# Patient Record
Sex: MALE
[De-identification: names, ages, dates, MRNs, and addresses within clinical notes are randomized per-mention and may not be internally consistent; named-entity substitution may affect disease eponyms.]

---

## 2022-11-10 ENCOUNTER — NON-APPOINTMENT (OUTPATIENT)
Age: 62
End: 2022-11-10

## 2022-11-10 PROBLEM — Z00.00 ENCOUNTER FOR PREVENTIVE HEALTH EXAMINATION: Status: ACTIVE | Noted: 2022-11-10

## 2022-11-11 ENCOUNTER — APPOINTMENT (OUTPATIENT)
Dept: UROLOGY | Facility: CLINIC | Age: 62
End: 2022-11-11
Payer: MEDICARE

## 2022-11-11 VITALS
SYSTOLIC BLOOD PRESSURE: 146 MMHG | BODY MASS INDEX: 27.9 KG/M2 | HEART RATE: 96 BPM | DIASTOLIC BLOOD PRESSURE: 85 MMHG | WEIGHT: 206 LBS | RESPIRATION RATE: 16 BRPM | OXYGEN SATURATION: 95 % | HEIGHT: 72 IN

## 2022-11-11 DIAGNOSIS — Z95.5 PRESENCE OF CORONARY ANGIOPLASTY IMPLANT AND GRAFT: ICD-10-CM

## 2022-11-11 DIAGNOSIS — Z82.3 FAMILY HISTORY OF STROKE: ICD-10-CM

## 2022-11-11 DIAGNOSIS — Z86.39 PERSONAL HISTORY OF OTHER ENDOCRINE, NUTRITIONAL AND METABOLIC DISEASE: ICD-10-CM

## 2022-11-11 DIAGNOSIS — Z78.9 OTHER SPECIFIED HEALTH STATUS: ICD-10-CM

## 2022-11-11 DIAGNOSIS — Z86.79 PERSONAL HISTORY OF OTHER DISEASES OF THE CIRCULATORY SYSTEM: ICD-10-CM

## 2022-11-11 PROCEDURE — 76872 US TRANSRECTAL: CPT

## 2022-11-11 PROCEDURE — 99204 OFFICE O/P NEW MOD 45 MIN: CPT

## 2022-11-11 RX ORDER — FENOFIBRATE 160 MG/1
160 TABLET ORAL
Qty: 90 | Refills: 0 | Status: ACTIVE | COMMUNITY
Start: 2022-11-07

## 2022-11-11 RX ORDER — METOPROLOL TARTRATE 25 MG/1
25 TABLET, FILM COATED ORAL
Qty: 180 | Refills: 0 | Status: ACTIVE | COMMUNITY
Start: 2022-11-07

## 2022-11-11 RX ORDER — CLOPIDOGREL BISULFATE 75 MG/1
75 TABLET, FILM COATED ORAL
Qty: 90 | Refills: 0 | Status: ACTIVE | COMMUNITY
Start: 2022-11-07

## 2022-11-11 RX ORDER — FLUTICASONE PROPIONATE 50 UG/1
50 SPRAY, METERED NASAL
Qty: 16 | Refills: 0 | Status: ACTIVE | COMMUNITY
Start: 2022-05-19

## 2022-11-11 RX ORDER — MUPIROCIN 20 MG/G
2 OINTMENT TOPICAL
Qty: 22 | Refills: 0 | Status: ACTIVE | COMMUNITY
Start: 2022-11-07

## 2022-11-11 RX ORDER — TAMSULOSIN HYDROCHLORIDE 0.4 MG/1
0.4 CAPSULE ORAL
Qty: 180 | Refills: 0 | Status: ACTIVE | COMMUNITY
Start: 2022-10-24

## 2022-11-11 RX ORDER — CIPROFLOXACIN HYDROCHLORIDE 500 MG/1
500 TABLET, FILM COATED ORAL
Qty: 20 | Refills: 0 | Status: ACTIVE | COMMUNITY
Start: 2022-10-24

## 2022-11-11 RX ORDER — FINASTERIDE 5 MG/1
5 TABLET, FILM COATED ORAL
Qty: 90 | Refills: 0 | Status: ACTIVE | COMMUNITY
Start: 2022-10-24

## 2022-11-11 RX ORDER — ROSUVASTATIN CALCIUM 5 MG/1
5 TABLET, FILM COATED ORAL
Qty: 90 | Refills: 0 | Status: ACTIVE | COMMUNITY
Start: 2022-11-07

## 2022-11-11 RX ORDER — AMOXICILLIN AND CLAVULANATE POTASSIUM 875; 125 MG/1; MG/1
875-125 TABLET, COATED ORAL
Qty: 20 | Refills: 0 | Status: ACTIVE | COMMUNITY
Start: 2022-10-25

## 2022-11-11 RX ORDER — TAMSULOSIN HYDROCHLORIDE 0.4 MG/1
0.4 CAPSULE ORAL
Refills: 0 | Status: ACTIVE | COMMUNITY

## 2022-11-11 NOTE — ASSESSMENT
[FreeTextEntry1] : The patient has recurrent urinary retention despite combination pharmacotherapy, secondary to very large volume prostate. Transrectal ultrasound today showed a prostate with a 200 cc volume. I discussed the options for his condition including  open or robotic simple prostatectomy to remove the prostate, aquablation, or llaser enucleation.  Patient expressed interest in an enucleation and he will be referred to Dr. Knox for further evaluation and information on the enucleation as to make a more informed decision. He was instructed to continue taking his finasteride and tamsulosin for now.

## 2022-11-11 NOTE — PHYSICAL EXAM
[General Appearance - Well Developed] : well developed [General Appearance - Well Nourished] : well nourished [Normal Appearance] : normal appearance [Well Groomed] : well groomed [General Appearance - In No Acute Distress] : no acute distress [Edema] : no peripheral edema [Respiration, Rhythm And Depth] : normal respiratory rhythm and effort [Exaggerated Use Of Accessory Muscles For Inspiration] : no accessory muscle use [Abdomen Soft] : soft [Abdomen Tenderness] : non-tender [Costovertebral Angle Tenderness] : no ~M costovertebral angle tenderness [Urethral Meatus] : meatus normal [Urinary Bladder Findings] : the bladder was normal on palpation [Scrotum] : the scrotum was normal [Testes Mass (___cm)] : there were no testicular masses [No Prostate Nodules] : no prostate nodules [Prostate Size ___ (0-4)] : prostate size [unfilled] (scale: 0-4) [Normal Station and Gait] : the gait and station were normal for the patient's age [] : no rash [No Focal Deficits] : no focal deficits [Oriented To Time, Place, And Person] : oriented to person, place, and time [Mood] : the mood was normal [Affect] : the affect was normal [Not Anxious] : not anxious [No Palpable Adenopathy] : no palpable adenopathy [Prostate Tenderness] : the prostate was not tender [FreeTextEntry1] : catheter in place, draining clear urine

## 2022-11-11 NOTE — HISTORY OF PRESENT ILLNESS
[FreeTextEntry1] : Patient is a 61 y/o male that presents with history of enlarged prostate. He has seen a urologist for the past 4 years, and is currently on finasteride and tamsulosin. His urologist recommended he have a surgery to remove the prostate due to the size. Patient was referred by Dr. Harris. He reports visiting the ED 3 times in the past month due to urinary retention. Patient's latest catheter was placed 2 days ago. \par \par Patient is typically voiding with a slow stream, frequency, hesitancy, urgency, and nocturia every 2 hours. He has post-void dribbling, stress and urge incontinence, as well suprapubic pain that he describes a a pressure and burning sensation.  Patient wears diapers due to his continence. \par \par Patient did have a prostate biopsy 2 years ago done by  Dr. Alvarez that reportefly showed no signs of prostate cancer. Patient denies family history of prostate cancer.

## 2022-11-11 NOTE — REVIEW OF SYSTEMS
[Pain during urination] : pain during urination [Wake up at night to urinate  How many times?  ___] : wakes up to urinate [unfilled] times during the night [Strain or push to urinate] : strain or push to urinate [Slow urine stream] : slow urine stream [Leakage of urine with urgency] : leakage of urine with urgency [Leakage of urine with straining, coughing, laughing] : leakage of urine with straining, coughing, laughing [Negative] : Heme/Lymph [Incontinence] : incontinence [Hesitancy] : urinary hesitancy [Nocturia] : nocturia [Urine Infection (bladder/kidney)] : denies bladder/kidney infections [Pain at onset of urination] : denies pain during onset of urination [Pain after urination] : denies pain after urination [Blood in urine that you can see] : denies seeing blood in urine [Told you have blood in urine on a urine test] : denies being told that blood was present in a urine test [Discharge from urine canal] : denies discharge from urine canal [History of kidney stones] : denies history of kidney stones [Urine retention] : denies urine retention [Strong urge to urinate] : denies strong urge to urinate [Bladder pressure] : denies bladder pressure [Wait a long time to urinate] : denies waiting a long time to urinate [Interrupted urine stream] : denies interrupted urine stream [Bladder fullness after urinating] : denies bladder fullness after urinating [Increased pain/discomfort with bladder filling] : denies increased pain/discomfort with bladder filling [Bladder problems as child. If yes, describe..] : denies bladder problems as child [Unaware of when urine is leaking] : aware of when urine is leaking

## 2022-11-11 NOTE — LETTER BODY
[Dear  ___] : Dear  [unfilled], [Please see my note below.] : Please see my note below. [Consult Closing:] : Thank you very much for allowing me to participate in the care of this patient.  If you have any questions, please do not hesitate to contact me. [Sincerely,] : Sincerely, [Consult Letter:] : I had the pleasure of evaluating your patient, [unfilled]. [FreeTextEntry3] : Marty

## 2022-11-11 NOTE — ADDENDUM
[FreeTextEntry1] : \par April MUNOZ documented this note as a scribe on behalf of Dr. Marty Mckeon MD.\par

## 2022-11-16 ENCOUNTER — APPOINTMENT (OUTPATIENT)
Dept: UROLOGY | Facility: CLINIC | Age: 62
End: 2022-11-16

## 2022-11-16 VITALS
BODY MASS INDEX: 27.9 KG/M2 | DIASTOLIC BLOOD PRESSURE: 84 MMHG | SYSTOLIC BLOOD PRESSURE: 135 MMHG | HEIGHT: 72 IN | HEART RATE: 67 BPM | TEMPERATURE: 98.3 F | WEIGHT: 206 LBS

## 2022-11-16 PROCEDURE — 99214 OFFICE O/P EST MOD 30 MIN: CPT

## 2022-11-16 NOTE — HISTORY OF PRESENT ILLNESS
[FreeTextEntry1] : 62 yr old male, referred by Dr. Mckeon, to discuss prostate reductive surgery with HoLEP. Patient has history of enlarged prostate that he has been seeing a urologist, Dr. Harris,  for 4 years and has recommended he have a procedure due to size. He has been to ED 3x in the last month for urinary retention He currently has a catheter that was placed on 11/9/22. His prostate measured 200 cc on ultrasound on 11/10/22 with Dr. Mckeon.\par \par His baseline urination is slow stream, frequency, urgency, hesitancy, nocturia, post-void dribbling. He has stress and urge incontinence which requires him to wear depends. \par \par He underwent prostate biopsy in 2020 which was reportedly benign. \par \par He is taking aspirin and plavix for CAD. He had cardaic stents placed ten years ago. He was told that he cannot stop plavix for more than a few days. 12-Apr-2021 09:43

## 2022-11-16 NOTE — PHYSICAL EXAM
[General Appearance - Well Developed] : well developed [General Appearance - Well Nourished] : well nourished [Normal Appearance] : normal appearance [Well Groomed] : well groomed [General Appearance - In No Acute Distress] : no acute distress [Edema] : no peripheral edema [Respiration, Rhythm And Depth] : normal respiratory rhythm and effort [Exaggerated Use Of Accessory Muscles For Inspiration] : no accessory muscle use [Abdomen Soft] : soft [Abdomen Tenderness] : non-tender [Costovertebral Angle Tenderness] : no ~M costovertebral angle tenderness [Urethral Meatus] : meatus normal [Urinary Bladder Findings] : the bladder was normal on palpation [Scrotum] : the scrotum was normal [Testes Mass (___cm)] : there were no testicular masses [Normal Station and Gait] : the gait and station were normal for the patient's age [] : no rash [No Focal Deficits] : no focal deficits [Oriented To Time, Place, And Person] : oriented to person, place, and time [Affect] : the affect was normal [Mood] : the mood was normal [Not Anxious] : not anxious [No Palpable Adenopathy] : no palpable adenopathy [FreeTextEntry1] : Catheter in place

## 2022-11-16 NOTE — ASSESSMENT
[FreeTextEntry1] : Assessment: Mr. NEETU BERUMEN  is a 62 year year old man with urinary retention, prostate volume of 200 cc. This is most likely due to bladder outlet obstruction secondary to his enlarged prostate, although it is difficult to assess the role of bladder dysfunction, contributing to his symptoms.\par \par We discussed different therapeutic options including TURP, PVP, simple prostatectomy (open, laparoscopic or transurethral laser enucleation), Aquablation, Urolift therapy, and Rezum in addition to full continuation of medical therapy. Discussed the issues of incontinence, retrograde ejaculation, erectile dysfunction, irritative voiding symptoms, injury to urethra and bladder, persistence of irritative voiding symptoms, urethral stricture or bladder neck contracture, need for open surgery to repair the injury, erectile dysfunction and infertility.\par \par I made it clear that because of his large prostate size > 80 grams, AUA recommendation is to perform a simple prostatectomy due to the ability to obtain effective and durable improvement in voiding symptoms. A simple prostatectomy can be performed via an open approach, laparoscopic approach or transurethral laser enucleation approach. We discussed the risks and benefits of each approach. Overall, the long term outcomes are similar. However, the laser enucleation procedure has the least morbidity with quickest recovery of the three options. Therefore, I have recommended laser enucleation of the prostate performed via a transurethral approach.\par \par Mr. BERUMEN decided to proceed with [employer] laser enucleation of prostate followed by prostate morcellation. Therefore, I spent a good amount of time discussing the risks and benefits of this procedure. We discussed potential risks of incontinence, retrograde ejaculation and infertility, erectile dysfunction, irritative voiding symptoms, injury to the urethra and bladder, rare need to convert to an open surgery.\par \par  - OR for ThuLEP/HoLEP\par  - Pre-op cardiac clearance. Ideally, he can hold his plavix joel-op.\par  - He is currently taking doxycycline for recent tick bite\par  - F/U urine culture

## 2022-11-16 NOTE — LETTER BODY
[Dear  ___] : Dear  [unfilled], [Courtesy Letter:] : I had the pleasure of seeing your patient, [unfilled], in my office today. [Please see my note below.] : Please see my note below. [Consult Closing:] : Thank you very much for allowing me to participate in the care of this patient.  If you have any questions, please do not hesitate to contact me. [Sincerely,] : Sincerely, [FreeTextEntry3] : Zachery Knox MD

## 2022-11-17 LAB
ALBUMIN SERPL ELPH-MCNC: 4.8 G/DL
ALP BLD-CCNC: 96 U/L
ALT SERPL-CCNC: 13 U/L
ANION GAP SERPL CALC-SCNC: 14 MMOL/L
APPEARANCE: ABNORMAL
APTT BLD: 32.5 SEC
AST SERPL-CCNC: 16 U/L
BACTERIA: NEGATIVE
BASOPHILS # BLD AUTO: 0.02 K/UL
BASOPHILS NFR BLD AUTO: 0.3 %
BILIRUB SERPL-MCNC: 0.6 MG/DL
BILIRUBIN URINE: NEGATIVE
BLOOD URINE: ABNORMAL
BUN SERPL-MCNC: 13 MG/DL
CALCIUM SERPL-MCNC: 10.6 MG/DL
CHLORIDE SERPL-SCNC: 102 MMOL/L
CO2 SERPL-SCNC: 23 MMOL/L
COLOR: ABNORMAL
CREAT SERPL-MCNC: 0.87 MG/DL
EGFR: 96 ML/MIN/1.73M2
EOSINOPHIL # BLD AUTO: 0.08 K/UL
EOSINOPHIL NFR BLD AUTO: 1.1 %
GLUCOSE QUALITATIVE U: NEGATIVE
GLUCOSE SERPL-MCNC: 108 MG/DL
HCT VFR BLD CALC: 47.9 %
HGB BLD-MCNC: 15.8 G/DL
HYALINE CASTS: 0 /LPF
IMM GRANULOCYTES NFR BLD AUTO: 0.3 %
INR PPP: 1.1 RATIO
KETONES URINE: NEGATIVE
LEUKOCYTE ESTERASE URINE: NEGATIVE
LYMPHOCYTES # BLD AUTO: 2.14 K/UL
LYMPHOCYTES NFR BLD AUTO: 30.1 %
MAN DIFF?: NORMAL
MCHC RBC-ENTMCNC: 29.5 PG
MCHC RBC-ENTMCNC: 33 GM/DL
MCV RBC AUTO: 89.4 FL
MICROSCOPIC-UA: NORMAL
MONOCYTES # BLD AUTO: 0.65 K/UL
MONOCYTES NFR BLD AUTO: 9.1 %
NEUTROPHILS # BLD AUTO: 4.2 K/UL
NEUTROPHILS NFR BLD AUTO: 59.1 %
NITRITE URINE: NEGATIVE
PH URINE: 5.5
PLATELET # BLD AUTO: 253 K/UL
POTASSIUM SERPL-SCNC: 4.5 MMOL/L
PROT SERPL-MCNC: 7.3 G/DL
PROTEIN URINE: NEGATIVE
PT BLD: 13 SEC
RBC # BLD: 5.36 M/UL
RBC # FLD: 13.1 %
RED BLOOD CELLS URINE: 13 /HPF
SODIUM SERPL-SCNC: 139 MMOL/L
SPECIFIC GRAVITY URINE: 1.01
SQUAMOUS EPITHELIAL CELLS: 0 /HPF
UROBILINOGEN URINE: NORMAL
WBC # FLD AUTO: 7.11 K/UL
WHITE BLOOD CELLS URINE: 1 /HPF

## 2022-11-18 LAB — BACTERIA UR CULT: NORMAL

## 2022-11-18 RX ORDER — METOPROLOL TARTRATE 50 MG
1 TABLET ORAL
Qty: 0 | Refills: 0 | DISCHARGE

## 2022-11-18 RX ORDER — ROSUVASTATIN CALCIUM 5 MG/1
1 TABLET ORAL
Qty: 0 | Refills: 0 | DISCHARGE

## 2022-11-18 RX ORDER — FENOFIBRATE,MICRONIZED 130 MG
1 CAPSULE ORAL
Qty: 0 | Refills: 0 | DISCHARGE

## 2022-11-18 NOTE — ASU PATIENT PROFILE, ADULT - NSICDXPASTMEDICALHX_GEN_ALL_CORE_FT
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

## 2022-11-18 NOTE — ASU PATIENT PROFILE, ADULT - FALL HARM RISK - UNIVERSAL INTERVENTIONS
Bed in lowest position, wheels locked, appropriate side rails in place/Call bell, personal items and telephone in reach/Instruct patient to call for assistance before getting out of bed or chair/Non-slip footwear when patient is out of bed/[place] to call system/Physically safe environment - no spills, clutter or unnecessary equipment/Purposeful Proactive Rounding/Room/bathroom lighting operational, light cord in reach

## 2022-11-20 ENCOUNTER — TRANSCRIPTION ENCOUNTER (OUTPATIENT)
Age: 62
End: 2022-11-20

## 2022-11-21 ENCOUNTER — APPOINTMENT (OUTPATIENT)
Dept: UROLOGY | Facility: AMBULATORY SURGERY CENTER | Age: 62
End: 2022-11-21

## 2022-11-21 ENCOUNTER — INPATIENT (INPATIENT)
Facility: HOSPITAL | Age: 62
LOS: 0 days | Discharge: HOME | DRG: 713 | End: 2022-11-22
Attending: SURGERY | Admitting: SURGERY
Payer: MEDICARE

## 2022-11-21 ENCOUNTER — RESULT REVIEW (OUTPATIENT)
Age: 62
End: 2022-11-21

## 2022-11-21 ENCOUNTER — TRANSCRIPTION ENCOUNTER (OUTPATIENT)
Age: 62
End: 2022-11-21

## 2022-11-21 VITALS
HEIGHT: 72 IN | TEMPERATURE: 98 F | DIASTOLIC BLOOD PRESSURE: 78 MMHG | OXYGEN SATURATION: 98 % | HEART RATE: 58 BPM | WEIGHT: 205.91 LBS | SYSTOLIC BLOOD PRESSURE: 124 MMHG | RESPIRATION RATE: 16 BRPM

## 2022-11-21 DIAGNOSIS — Z98.890 OTHER SPECIFIED POSTPROCEDURAL STATES: Chronic | ICD-10-CM

## 2022-11-21 PROCEDURE — 52649 PROSTATE LASER ENUCLEATION: CPT | Mod: 22

## 2022-11-21 PROCEDURE — 88305 TISSUE EXAM BY PATHOLOGIST: CPT | Mod: 26

## 2022-11-21 DEVICE — URETERAL CATH OPEN END 5FR 70CM: Type: IMPLANTABLE DEVICE | Status: FUNCTIONAL

## 2022-11-21 DEVICE — GUIDEWIRE SENSOR DUAL-FLEX NITINOL ANGLED .035" X 150CM: Type: IMPLANTABLE DEVICE | Status: FUNCTIONAL

## 2022-11-21 RX ORDER — ASPIRIN/CALCIUM CARB/MAGNESIUM 324 MG
325 TABLET ORAL ONCE
Refills: 0 | Status: COMPLETED | OUTPATIENT
Start: 2022-11-21 | End: 2022-11-21

## 2022-11-21 RX ORDER — TAMSULOSIN HYDROCHLORIDE 0.4 MG/1
2 CAPSULE ORAL
Qty: 0 | Refills: 0 | DISCHARGE

## 2022-11-21 RX ORDER — ACETAMINOPHEN 500 MG
650 TABLET ORAL EVERY 6 HOURS
Refills: 0 | Status: DISCONTINUED | OUTPATIENT
Start: 2022-11-21 | End: 2022-11-22

## 2022-11-21 RX ORDER — ONDANSETRON 8 MG/1
4 TABLET, FILM COATED ORAL EVERY 6 HOURS
Refills: 0 | Status: DISCONTINUED | OUTPATIENT
Start: 2022-11-21 | End: 2022-11-22

## 2022-11-21 RX ORDER — SODIUM CHLORIDE 9 MG/ML
1000 INJECTION, SOLUTION INTRAVENOUS
Refills: 0 | Status: DISCONTINUED | OUTPATIENT
Start: 2022-11-21 | End: 2022-11-22

## 2022-11-21 RX ORDER — FINASTERIDE 5 MG/1
1 TABLET, FILM COATED ORAL
Qty: 0 | Refills: 0 | DISCHARGE

## 2022-11-21 RX ORDER — AMPICILLIN TRIHYDRATE 250 MG
1000 CAPSULE ORAL EVERY 6 HOURS
Refills: 0 | Status: DISCONTINUED | OUTPATIENT
Start: 2022-11-21 | End: 2022-11-22

## 2022-11-21 RX ORDER — METHENAMINE, SODIUM PHOSPHATE, MONOBASIC, MONOHYDRATE, PHENYL SALICYLATE, METHYLENE BLUE, AND HYOSCYAMINE SULFATE 120; 40.8; 36; 10; .12 MG/1; MG/1; MG/1; MG/1; MG/1
1 CAPSULE ORAL
Qty: 0 | Refills: 0 | DISCHARGE

## 2022-11-21 RX ORDER — METOPROLOL TARTRATE 50 MG
25 TABLET ORAL DAILY
Refills: 0 | Status: DISCONTINUED | OUTPATIENT
Start: 2022-11-21 | End: 2022-11-22

## 2022-11-21 RX ORDER — GENTAMICIN SULFATE 40 MG/ML
80 VIAL (ML) INJECTION EVERY 8 HOURS
Refills: 0 | Status: DISCONTINUED | OUTPATIENT
Start: 2022-11-21 | End: 2022-11-22

## 2022-11-21 RX ADMIN — Medication 108 MILLIGRAM(S): at 22:26

## 2022-11-21 RX ADMIN — Medication 325 MILLIGRAM(S): at 14:51

## 2022-11-21 NOTE — ASU DISCHARGE PLAN (ADULT/PEDIATRIC) - CARE PROVIDER_API CALL
Zachery Knox)  Urology  [address] [place] [address]  Phone: [phone]  Fax: [phone]  Follow Up Time:

## 2022-11-21 NOTE — BRIEF OPERATIVE NOTE - OPERATION/FINDINGS
Laser enucleation of prostate with morcellation. 22F 3-way coude placed at end of case and CBI started. Patient tolerated the procedure well and was transferred to PACU in stable condition. Please see operative note for full details of the case.

## 2022-11-21 NOTE — H&P ADULT - NSICDXPASTMEDICALHX_GEN_ALL_CORE_FT
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

## 2022-11-21 NOTE — H&P ADULT - ASSESSMENT
62 yr old male with history of HTN, HLD, CAD with stents, DM, CVA, BPH s/p laser enucleation of prostate 11/21/22.    Plan:  -Admit to regional  -Continue 22F 3-way coude on CBI  -Trial of void x3 tomorrow  -Regular diet  -IVF  -Pain/nausea control  -Continue amp/gent for 24 hours

## 2022-11-21 NOTE — ASU DISCHARGE PLAN (ADULT/PEDIATRIC) - ASU DC SPECIAL INSTRUCTIONSFT
LASER ENUCLEATION OF PROSTATE    GENERAL: It is common to have blood in your urine after your procedure.  It may be pink or even red; and it is important to increase fluid intake to 2-3L of water per day to keep the urine as clear as possible. Please inform your doctor if you have a significant amount of clot in the urine or if you are unable to void at all.  The urine may clear and then become bloody again especially as you are more physically active. It is not uncommon to have some burning when you urinate, this will gradually improve. With a catheter in place, it is not uncommon to have occasional leakage or urine or blood around the catheter. Please call your urologist if this is excessive and/or the urine is not draining through the catheter into the bag.    CATHETER: Most patients are sent home with a Parra catheter, which continuously drains the urine from the bladder. If you still have a catheter, the nurses will review instructions and care before you go home.    PAIN: You may take Tylenol (acetaminophen) 650-975mg and/or Motrin (ibuprofen) 400-600mg, both available over the counter, for pain every 6 hours as needed. Do not exceed 4000mg of Tylenol (acetaminophen) daily. You may alternate these medications such that you take one or the other every 3 hours for around the clock pain coverage.    ANTIBIOTICS: You may be given a prescription for an antibiotic, please take this medication as instructed and be sure to complete the entire course.    STOOL SOFTENERS: Do not allow yourself to become constipated as straining may cause bleeding. Take stool softeners or a laxative (ex. Miralax, Colace, Senokot, ExLax, etc), available over the counter, if needed.    ANTICOAGULATION: If you are taking any blood thinning medications, please discuss with your urologist prior to restarting these medications unless otherwise specified.    BATHING: You may shower or bathe. If going home with parra, shower only until catheter is removed.    DIET: You may resume your regular diet and regular medication regimen.    ACTIVITY: No heavy lifting or strenuous exercise until you are evaluated at your post-operative appointment. Otherwise, you may return to your usual level of physical activity.    FOLLOW-UP: Please follow up with Dr. Knox. If you did not already schedule your post-operative appointment, please call your urologist to schedule and follow-up appointment.    CALL YOUR UROLOGIST IF: You have any bleeding that does not stop, inability to void >8 hours, fever over 100.4 F, chills, persistent nausea/vomiting, changes in your incision concerning for infection, or if your pain is not controlled on your discharge pain medications.

## 2022-11-21 NOTE — ASU DISCHARGE PLAN (ADULT/PEDIATRIC) - NS MD DC FALL RISK RISK
For information on Fall & Injury Prevention, visit: [email or web address] OR  [email or web address] OR  https://www.cdc.gov/steadi/patient.html

## 2022-11-21 NOTE — H&P ADULT - NSHPPHYSICALEXAM_GEN_ALL_CORE
General: Comfortable, NAD  HEENT: Normocephalic, atraumatic.  Abd: Soft, NT/ND  : 22F 3-way coude on CBI draining clear yellow urine  Skin: No rashes  Ext: No peripheral edema, SCDs in place

## 2022-11-21 NOTE — H&P ADULT - HISTORY OF PRESENT ILLNESS
62 yr old male with history of HTN, HLD, CAD with stents, DM, CVA, BPH presents for laser enucleation of prostate 11/21/22. 62 yr old male with history of HTN, HLD, CAD with stents, CVA, BPH presents for laser enucleation of prostate 11/21/22.

## 2022-11-21 NOTE — BRIEF OPERATIVE NOTE - NSICDXBRIEFPROCEDURE_GEN_ALL_CORE_FT
PROCEDURES:  Enucleation of prostate with morcellation using laser 21-Nov-2022 20:28:53  Noah Guerrero

## 2022-11-22 ENCOUNTER — TRANSCRIPTION ENCOUNTER (OUTPATIENT)
Age: 62
End: 2022-11-22

## 2022-11-22 VITALS — SYSTOLIC BLOOD PRESSURE: 108 MMHG | DIASTOLIC BLOOD PRESSURE: 67 MMHG

## 2022-11-22 DIAGNOSIS — N40.1 BENIGN PROSTATIC HYPERPLASIA WITH LOWER URINARY TRACT SYMPTOMS: ICD-10-CM

## 2022-11-22 LAB
ANION GAP SERPL CALC-SCNC: 7 MMOL/L — SIGNIFICANT CHANGE UP (ref 5–17)
BUN SERPL-MCNC: 9 MG/DL — SIGNIFICANT CHANGE UP (ref 7–23)
CALCIUM SERPL-MCNC: 9.1 MG/DL — SIGNIFICANT CHANGE UP (ref 8.4–10.5)
CHLORIDE SERPL-SCNC: 105 MMOL/L — SIGNIFICANT CHANGE UP (ref 96–108)
CO2 SERPL-SCNC: 26 MMOL/L — SIGNIFICANT CHANGE UP (ref 22–31)
CREAT SERPL-MCNC: 0.93 MG/DL — SIGNIFICANT CHANGE UP (ref 0.5–1.3)
EGFR: 92 ML/MIN/1.73M2 — SIGNIFICANT CHANGE UP
GLUCOSE SERPL-MCNC: 124 MG/DL — HIGH (ref 70–99)
HCT VFR BLD CALC: 37.9 % — LOW (ref 39–50)
HCV AB S/CO SERPL IA: 0.04 S/CO — SIGNIFICANT CHANGE UP
HCV AB SERPL-IMP: SIGNIFICANT CHANGE UP
HGB BLD-MCNC: 12.3 G/DL — LOW (ref 13–17)
MAGNESIUM SERPL-MCNC: 1.8 MG/DL — SIGNIFICANT CHANGE UP (ref 1.6–2.6)
MCHC RBC-ENTMCNC: 29.5 PG — SIGNIFICANT CHANGE UP (ref 27–34)
MCHC RBC-ENTMCNC: 32.5 GM/DL — SIGNIFICANT CHANGE UP (ref 32–36)
MCV RBC AUTO: 90.9 FL — SIGNIFICANT CHANGE UP (ref 80–100)
NRBC # BLD: 0 /100 WBCS — SIGNIFICANT CHANGE UP (ref 0–0)
PHOSPHATE SERPL-MCNC: 3.2 MG/DL — SIGNIFICANT CHANGE UP (ref 2.5–4.5)
PLATELET # BLD AUTO: 225 K/UL — SIGNIFICANT CHANGE UP (ref 150–400)
POTASSIUM SERPL-MCNC: 4.1 MMOL/L — SIGNIFICANT CHANGE UP (ref 3.5–5.3)
POTASSIUM SERPL-SCNC: 4.1 MMOL/L — SIGNIFICANT CHANGE UP (ref 3.5–5.3)
RBC # BLD: 4.17 M/UL — LOW (ref 4.2–5.8)
RBC # FLD: 12.2 % — SIGNIFICANT CHANGE UP (ref 10.3–14.5)
SODIUM SERPL-SCNC: 138 MMOL/L — SIGNIFICANT CHANGE UP (ref 135–145)
WBC # BLD: 10.92 K/UL — HIGH (ref 3.8–10.5)
WBC # FLD AUTO: 10.92 K/UL — HIGH (ref 3.8–10.5)

## 2022-11-22 PROCEDURE — 85027 COMPLETE CBC AUTOMATED: CPT

## 2022-11-22 PROCEDURE — 86900 BLOOD TYPING SEROLOGIC ABO: CPT

## 2022-11-22 PROCEDURE — 86901 BLOOD TYPING SEROLOGIC RH(D): CPT

## 2022-11-22 PROCEDURE — 88305 TISSUE EXAM BY PATHOLOGIST: CPT

## 2022-11-22 PROCEDURE — 86803 HEPATITIS C AB TEST: CPT

## 2022-11-22 PROCEDURE — 36415 COLL VENOUS BLD VENIPUNCTURE: CPT

## 2022-11-22 PROCEDURE — 86850 RBC ANTIBODY SCREEN: CPT

## 2022-11-22 PROCEDURE — 87086 URINE CULTURE/COLONY COUNT: CPT

## 2022-11-22 PROCEDURE — 84100 ASSAY OF PHOSPHORUS: CPT

## 2022-11-22 PROCEDURE — 83735 ASSAY OF MAGNESIUM: CPT

## 2022-11-22 PROCEDURE — 80048 BASIC METABOLIC PNL TOTAL CA: CPT

## 2022-11-22 PROCEDURE — C1758: CPT

## 2022-11-22 PROCEDURE — C1769: CPT

## 2022-11-22 RX ORDER — CLOPIDOGREL BISULFATE 75 MG/1
1 TABLET, FILM COATED ORAL
Qty: 0 | Refills: 0 | DISCHARGE

## 2022-11-22 RX ORDER — ASPIRIN/CALCIUM CARB/MAGNESIUM 324 MG
81 TABLET ORAL
Qty: 0 | Refills: 0 | DISCHARGE
Start: 2022-11-22 | End: 2022-11-26

## 2022-11-22 RX ADMIN — Medication 200 MILLIGRAM(S): at 07:20

## 2022-11-22 RX ADMIN — Medication 104 MILLIGRAM(S): at 00:40

## 2022-11-22 RX ADMIN — Medication 108 MILLIGRAM(S): at 10:24

## 2022-11-22 RX ADMIN — Medication 108 MILLIGRAM(S): at 04:15

## 2022-11-22 NOTE — DISCHARGE NOTE PROVIDER - NSDCCPCAREPLAN_GEN_ALL_CORE_FT
PRINCIPAL DISCHARGE DIAGNOSIS  Diagnosis: BPH with urinary obstruction  Assessment and Plan of Treatment:       SECONDARY DISCHARGE DIAGNOSES  Diagnosis: HTN (hypertension)  Assessment and Plan of Treatment:     Diagnosis: CAD (coronary artery disease)  Assessment and Plan of Treatment:

## 2022-11-22 NOTE — PROGRESS NOTE ADULT - PROBLEM SELECTOR PLAN 1
continue parra catheter   - monitor urine output   - clamp CBI in morning   - Amp/Gent  - diet: regular   - OOB/IS   - SCDs.
- continue parra catheter   - monitor urine output   - clamp CBI in morning   - Amp/Gent  - diet: regular   - OOB/IS   - SCDs

## 2022-11-22 NOTE — DISCHARGE NOTE PROVIDER - HOSPITAL COURSE
61 yo m with bph s/p laser enucleation of prostate, tolerated procedure well, vss, afebrile, ambulatory, tolerating po, pain controlled, hemodynamically stable.

## 2022-11-22 NOTE — DISCHARGE NOTE PROVIDER - NSDCMRMEDTOKEN_GEN_ALL_CORE_FT
aspirin 81 mg oral tablet: 81 milligram(s) orally once a day (in the morning)  doxycycline hyclate 100 mg oral capsule: 1 cap(s) orally 2 times a day  fenofibrate 30 mg oral capsule: 1 cap(s) orally once a day  metoprolol succinate 25 mg oral tablet, extended release: 1 tab(s) orally once a day  rosuvastatin 5 mg oral tablet: 1 tab(s) orally 2 times a day

## 2022-11-22 NOTE — DISCHARGE NOTE PROVIDER - CARE PROVIDER_API CALL
Zachery Knox)  Urology  [address] [place] [address]  Phone: [phone]  Fax: [phone]  Follow Up Time:

## 2022-11-22 NOTE — DISCHARGE NOTE PROVIDER - NSDCCPTREATMENT_GEN_ALL_CORE_FT
PRINCIPAL PROCEDURE  Procedure: Enucleation of prostate with morcellation using laser  Findings and Treatment:       SECONDARY PROCEDURE  Procedure: Enucleation of prostate with morcellation using laser  Findings and Treatment:

## 2022-11-22 NOTE — DISCHARGE NOTE NURSING/CASE MANAGEMENT/SOCIAL WORK - PATIENT PORTAL LINK FT
You can access the FollowMyHealth Patient Portal offered by [facility] by registering at the following website: [email or web address]. By joining [employer]’s FollowMyHealth portal, you will also be able to view your health information using other applications (apps) compatible with our system.

## 2022-11-22 NOTE — DISCHARGE NOTE PROVIDER - NSDCFUADDINST_GEN_ALL_CORE_FT
Stay well hydrated, continue regular diet, you may shower. No strenuous activity or heavy lifting. Please take aspirin 81 for 5 days, then stop the aspirin and restart your plavix. Please call Dr Knox with fever>100.4, any questions and to set up your follow up appointment

## 2022-11-22 NOTE — PROGRESS NOTE ADULT - SUBJECTIVE AND OBJECTIVE BOX
INTERVAL HPI/OVERNIGHT EVENTS:  No acute events overnight.    VITALS:    T(F): 98.3 (11-22-22 @ 05:00), Max: 98.4 (11-21-22 @ 11:06)  HR: 65 (11-22-22 @ 05:00) (58 - 91)  BP: 100/56 (11-22-22 @ 05:00) (92/54 - 124/78)  RR: 18 (11-22-22 @ 05:00) (8 - 18)  SpO2: 98% (11-22-22 @ 05:00) (93% - 100%)  Wt(kg): --    I&O's Detail    21 Nov 2022 07:01  -  22 Nov 2022 05:18  --------------------------------------------------------  IN:    Continuous Bladder Irrigation (mL): 55197 mL    IV PiggyBack: 100 mL    IV PiggyBack: 100 mL    Lactated Ringers: 700 mL    Oral Fluid: 480 mL  Total IN: 28181 mL    OUT:    Continuous Bladder Irrigation (mL): 62587 mL  Total OUT: 83204 mL    Total NET: 380 mL          MEDICATIONS:    ANTIBIOTICS:  ampicillin  IVPB 1000 milliGRAM(s) IV Intermittent every 6 hours  gentamicin   IVPB 80 milliGRAM(s) IV Intermittent every 8 hours      PAIN CONTROL:  acetaminophen     Tablet .. 650 milliGRAM(s) Oral every 6 hours PRN  ondansetron Injectable 4 milliGRAM(s) IV Push every 6 hours PRN       MEDS:      HEME/ONC        PHYSICAL EXAM:  General: No acute distress.  Alert and Oriented  Abdominal Exam: soft, non-tender, non-distended    Exam: parra in place with CBI clamped. urine light pink       LABS:                
   POST OP NOTE:  procedure: prostate enucleation   Patient states he feels numbness in bilateral hands in thumb and pointer finger. Patient can move fingers and can . Denies chest pain, SOB, lightheadedness, fever, chills, nausea or vomiting     11-21-22 @ 07:01  -  11-22-22 @ 02:54  --------------------------------------------------------  IN: 6890 mL / OUT: 4500 mL / NET: 2390 mL      T(C): 36.6 (11-21-22 @ 22:59), Max: 36.9 (11-21-22 @ 11:06)  HR: 70 (11-21-22 @ 22:59) (58 - 91)  BP: 111/72 (11-21-22 @ 22:59) (92/54 - 124/78)  RR: 18 (11-21-22 @ 22:59) (8 - 18)  SpO2: 99% (11-21-22 @ 22:59) (93% - 100%)    GEN: NAD  ABD: Soft, ND, NT  : parra in place with CBI running, output clear

## 2022-11-23 LAB
CULTURE RESULTS: NO GROWTH — SIGNIFICANT CHANGE UP
SPECIMEN SOURCE: SIGNIFICANT CHANGE UP

## 2022-11-23 RX ORDER — DOXYCYCLINE HYCLATE 100 MG/1
100 CAPSULE ORAL
Qty: 6 | Refills: 0 | Status: DISCONTINUED | COMMUNITY
Start: 2022-11-07 | End: 2022-11-23

## 2022-11-29 LAB — SURGICAL PATHOLOGY STUDY: SIGNIFICANT CHANGE UP

## 2022-11-30 ENCOUNTER — APPOINTMENT (OUTPATIENT)
Dept: UROLOGY | Facility: CLINIC | Age: 62
End: 2022-11-30

## 2022-11-30 DIAGNOSIS — N40.1 BENIGN PROSTATIC HYPERPLASIA WITH LOWER URINARY TRACT SYMPTOMS: ICD-10-CM

## 2022-11-30 DIAGNOSIS — I25.2 OLD MYOCARDIAL INFARCTION: ICD-10-CM

## 2022-11-30 DIAGNOSIS — E78.5 HYPERLIPIDEMIA, UNSPECIFIED: ICD-10-CM

## 2022-11-30 DIAGNOSIS — N13.8 OTHER OBSTRUCTIVE AND REFLUX UROPATHY: ICD-10-CM

## 2022-11-30 DIAGNOSIS — Z86.73 PERSONAL HISTORY OF TRANSIENT ISCHEMIC ATTACK (TIA), AND CEREBRAL INFARCTION WITHOUT RESIDUAL DEFICITS: ICD-10-CM

## 2022-11-30 DIAGNOSIS — Z95.5 PRESENCE OF CORONARY ANGIOPLASTY IMPLANT AND GRAFT: ICD-10-CM

## 2022-11-30 DIAGNOSIS — Z79.02 LONG TERM (CURRENT) USE OF ANTITHROMBOTICS/ANTIPLATELETS: ICD-10-CM

## 2022-11-30 DIAGNOSIS — R73.03 PREDIABETES: ICD-10-CM

## 2022-11-30 DIAGNOSIS — R33.8 OTHER RETENTION OF URINE: ICD-10-CM

## 2022-11-30 DIAGNOSIS — I25.10 ATHEROSCLEROTIC HEART DISEASE OF NATIVE CORONARY ARTERY WITHOUT ANGINA PECTORIS: ICD-10-CM

## 2022-11-30 DIAGNOSIS — Z79.82 LONG TERM (CURRENT) USE OF ASPIRIN: ICD-10-CM

## 2022-11-30 DIAGNOSIS — I10 ESSENTIAL (PRIMARY) HYPERTENSION: ICD-10-CM

## 2022-11-30 PROBLEM — E11.9 TYPE 2 DIABETES MELLITUS WITHOUT COMPLICATIONS: Chronic | Status: ACTIVE | Noted: 2022-11-18

## 2022-11-30 PROBLEM — I63.9 CEREBRAL INFARCTION, UNSPECIFIED: Chronic | Status: ACTIVE | Noted: 2022-11-18

## 2022-11-30 PROCEDURE — 51798 US URINE CAPACITY MEASURE: CPT

## 2022-11-30 PROCEDURE — 99214 OFFICE O/P EST MOD 30 MIN: CPT | Mod: 24

## 2022-11-30 NOTE — HISTORY OF PRESENT ILLNESS
[FreeTextEntry1] : 62 yr old male, referred by Dr. Mckeon, to discuss prostate reductive surgery with HoLEP. Patient has history of enlarged prostate that he has been seeing a urologist, Dr. Harris,  for 4 years and has recommended he have a procedure due to size. He has been to ED 3x in the last month for urinary retention He currently has a catheter that was placed on 11/9/22. His prostate measured 200 cc on ultrasound on 11/10/22 with Dr. Mckeon.\par \par His baseline urination is slow stream, frequency, urgency, hesitancy, nocturia, post-void dribbling. He has stress and urge incontinence which requires him to wear depends. \par \par He underwent prostate biopsy in 2020 which was reportedly benign. \par \par He is taking aspirin and plavix for CAD. He had cardaic stents placed ten years ago. He was told that he cannot stop plavix for more than a few days.\par \par 11/21/22: S/P ThuLEP, procedure uncomplicated, passed void trial POD 1. \par \par Pathology with 175 g benign prostate tissue\par \par 11/30/22: Doing well, voiding with good stream. Ongoing hematuria that worsened when he restarted plavix, so he stopped plavix and restarted aspirin. No significant clots. Mild stress leakage, occasional urge leakage if he doesn't go to the bathroom regularly.\par \par PVR 5 cc

## 2022-11-30 NOTE — ASSESSMENT
[FreeTextEntry1] : Assessment: Mr. NEETU BERUMEN is a 62 year year old man with urinary retention, prostate volume of 200 cc. S/P ThuLEP, procedure uncomplicated, passed void trial POD 1. Pathology with 175 g benign prostate tissue. Doing well, voiding with good stream. Ongoing hematuria that worsened when he restarted plavix, so he stopped plavix and restarted aspirin. No significant clots. Mild stress leakage, occasional urge leakage if he doesn't go to the bathroom regularly.\par  - F/U in 6 weeks for UA, IPSS, PVR\par  - CBC sent today\par  - UA, UCx

## 2022-12-01 ENCOUNTER — APPOINTMENT (OUTPATIENT)
Dept: UROLOGY | Facility: CLINIC | Age: 62
End: 2022-12-01

## 2022-12-01 LAB
BASOPHILS # BLD AUTO: 0.03 K/UL
BASOPHILS NFR BLD AUTO: 0.4 %
EOSINOPHIL # BLD AUTO: 0.1 K/UL
EOSINOPHIL NFR BLD AUTO: 1.3 %
HCT VFR BLD CALC: 41.1 %
HGB BLD-MCNC: 13 G/DL
IMM GRANULOCYTES NFR BLD AUTO: 0.4 %
LYMPHOCYTES # BLD AUTO: 2.79 K/UL
LYMPHOCYTES NFR BLD AUTO: 35.4 %
MAN DIFF?: NORMAL
MCHC RBC-ENTMCNC: 29.3 PG
MCHC RBC-ENTMCNC: 31.6 GM/DL
MCV RBC AUTO: 92.6 FL
MONOCYTES # BLD AUTO: 0.78 K/UL
MONOCYTES NFR BLD AUTO: 9.9 %
NEUTROPHILS # BLD AUTO: 4.15 K/UL
NEUTROPHILS NFR BLD AUTO: 52.6 %
PLATELET # BLD AUTO: 280 K/UL
RBC # BLD: 4.44 M/UL
RBC # FLD: 13.2 %
WBC # FLD AUTO: 7.88 K/UL

## 2022-12-02 LAB
APPEARANCE: ABNORMAL
BACTERIA UR CULT: NORMAL
BACTERIA: NEGATIVE
BILIRUBIN URINE: NEGATIVE
BLOOD URINE: ABNORMAL
COLOR: ABNORMAL
GLUCOSE QUALITATIVE U: NEGATIVE
HYALINE CASTS: 0 /LPF
KETONES URINE: NEGATIVE
LEUKOCYTE ESTERASE URINE: ABNORMAL
MICROSCOPIC-UA: NORMAL
NITRITE URINE: NEGATIVE
PH URINE: 6
PROTEIN URINE: ABNORMAL
RED BLOOD CELLS URINE: >720 /HPF
SPECIFIC GRAVITY URINE: 1.02
SQUAMOUS EPITHELIAL CELLS: 1 /HPF
UROBILINOGEN URINE: NORMAL
WHITE BLOOD CELLS URINE: 17 /HPF

## 2022-12-28 ENCOUNTER — APPOINTMENT (OUTPATIENT)
Dept: UROLOGY | Facility: CLINIC | Age: 62
End: 2022-12-28

## 2022-12-28 VITALS
TEMPERATURE: 98.3 F | DIASTOLIC BLOOD PRESSURE: 75 MMHG | HEART RATE: 65 BPM | OXYGEN SATURATION: 96 % | SYSTOLIC BLOOD PRESSURE: 113 MMHG

## 2022-12-28 DIAGNOSIS — R33.9 RETENTION OF URINE, UNSPECIFIED: ICD-10-CM

## 2022-12-28 PROCEDURE — 99024 POSTOP FOLLOW-UP VISIT: CPT

## 2022-12-28 PROCEDURE — 51798 US URINE CAPACITY MEASURE: CPT

## 2022-12-28 NOTE — HISTORY OF PRESENT ILLNESS
[FreeTextEntry1] : 62 yr old male, referred by Dr. Mckeon, to discuss prostate reductive surgery with HoLEP. Patient has history of enlarged prostate that he has been seeing a urologist, Dr. Harris,  for 4 years and has recommended he have a procedure due to size. He has been to ED 3x in the last month for urinary retention He currently has a catheter that was placed on 11/9/22. His prostate measured 200 cc on ultrasound on 11/10/22 with Dr. Mckeon.\par \par His baseline urination is slow stream, frequency, urgency, hesitancy, nocturia, post-void dribbling. He has stress and urge incontinence which requires him to wear depends. \par \par He underwent prostate biopsy in 2020 which was reportedly benign. \par \par He is taking aspirin and plavix for CAD. He had cardaic stents placed ten years ago. He was told that he cannot stop plavix for more than a few days.\par \par 11/21/22: S/P ThuLEP, procedure uncomplicated, passed void trial POD 1. \par \par Pathology with 175 g benign prostate tissue\par \par 11/30/22: Doing well, voiding with good stream. Ongoing hematuria that worsened when he restarted plavix, so he stopped plavix and restarted aspirin. No significant clots. Mild stress leakage, occasional urge leakage if he doesn't go to the bathroom regularly.\par \par PVR 5 cc\par \par 12/28/22: Doing well. Voiding well. Hematuria resolved. Ongoing stress incontinence that has improved significantly. Occasional urge incontinence if not near a bathroom. No fevers, chills, dysuria.\par \par IPSS 7, QOL 3, PVR 0 cc

## 2022-12-28 NOTE — ASSESSMENT
[FreeTextEntry1] : Assessment: Mr. NEETU BERUMEN is a 62 year year old man with urinary retention, prostate volume of 200 cc. S/P ThuLEP, procedure uncomplicated, passed void trial POD 1. Pathology with 175 g benign prostate tissue. Doing well, voiding with good stream. Hematuria resolved. Ongoing stress leakage, occasional urge leakage if he doesn't go to the bathroom regularly. Overall, improved from prior\par  - F/U in 6 weeks for UA, IPSS, PVR\par  - Continue Kegels\par

## 2022-12-30 LAB
APPEARANCE: ABNORMAL
BACTERIA UR CULT: NORMAL
BACTERIA: NEGATIVE
BILIRUBIN URINE: NEGATIVE
BLOOD URINE: ABNORMAL
COLOR: YELLOW
GLUCOSE QUALITATIVE U: NEGATIVE
HYALINE CASTS: 0 /LPF
KETONES URINE: NEGATIVE
LEUKOCYTE ESTERASE URINE: ABNORMAL
MICROSCOPIC-UA: NORMAL
NITRITE URINE: NEGATIVE
PH URINE: 6
PROTEIN URINE: ABNORMAL
RED BLOOD CELLS URINE: 40 /HPF
SPECIFIC GRAVITY URINE: 1.03
SQUAMOUS EPITHELIAL CELLS: 0 /HPF
URINE COMMENTS: NORMAL
UROBILINOGEN URINE: NORMAL
WHITE BLOOD CELLS URINE: 337 /HPF

## 2023-02-07 ENCOUNTER — APPOINTMENT (OUTPATIENT)
Dept: UROLOGY | Facility: CLINIC | Age: 63
End: 2023-02-07
Payer: MEDICARE

## 2023-02-07 VITALS
HEART RATE: 66 BPM | HEIGHT: 72 IN | OXYGEN SATURATION: 95 % | DIASTOLIC BLOOD PRESSURE: 66 MMHG | SYSTOLIC BLOOD PRESSURE: 101 MMHG | WEIGHT: 210 LBS | TEMPERATURE: 97.8 F | BODY MASS INDEX: 28.44 KG/M2

## 2023-02-07 DIAGNOSIS — R82.998 OTHER ABNORMAL FINDINGS IN URINE: ICD-10-CM

## 2023-02-07 LAB
BILIRUB UR QL STRIP: NORMAL
CLARITY UR: CLEAR
COLLECTION METHOD: NORMAL
GLUCOSE UR-MCNC: NORMAL
HCG UR QL: 0.2 EU/DL
HGB UR QL STRIP.AUTO: NORMAL
KETONES UR-MCNC: NORMAL
LEUKOCYTE ESTERASE UR QL STRIP: NORMAL
NITRITE UR QL STRIP: NORMAL
PH UR STRIP: 5.5
PROT UR STRIP-MCNC: NORMAL
SP GR UR STRIP: 1

## 2023-02-07 PROCEDURE — 99024 POSTOP FOLLOW-UP VISIT: CPT

## 2023-02-07 PROCEDURE — 51798 US URINE CAPACITY MEASURE: CPT

## 2023-02-07 PROCEDURE — 81003 URINALYSIS AUTO W/O SCOPE: CPT | Mod: QW

## 2023-02-07 RX ORDER — TADALAFIL 5 MG/1
5 TABLET ORAL
Qty: 10 | Refills: 3 | Status: ACTIVE | COMMUNITY
Start: 2023-02-07 | End: 1900-01-01

## 2023-02-07 NOTE — ASSESSMENT
[FreeTextEntry1] : Assessment: Mr. NEETU BERUMEN is a 62 year year old man with urinary retention, prostate volume of 200 cc. S/P ThuLEP on 11/21/22, procedure uncomplicated, passed void trial POD 1. Pathology with 175 g benign prostate tissue. Doing well, voiding with good stream. Hematuria resolved. Ongoing stress leakage but improved, occasional urge leakage if he doesn't go to the bathroom regularly. Overall, improved from prior. \par \par - F/u UA, UCx, PSA\par - Continue Kegels, pelvic PT\par - F/U in 3 months (UA, PVR, IPSS) \par

## 2023-02-07 NOTE — HISTORY OF PRESENT ILLNESS
[FreeTextEntry1] : 62 yr old male, referred by Dr. Mckeon, to discuss prostate reductive surgery with HoLEP. Patient has history of enlarged prostate that he has been seeing a urologist, Dr. Harris, for 4 years and has recommended he have a procedure due to size. He has been to ED 3x in the last month for urinary retention He currently has a catheter that was placed on 11/9/22. His prostate measured 200 cc on ultrasound on 11/10/22 with Dr. Mckeon.\par \par His baseline urination is slow stream, frequency, urgency, hesitancy, nocturia, post-void dribbling. He has stress and urge incontinence which requires him to wear depends. \par \par He underwent prostate biopsy in 2020 which was reportedly benign. \par \par He is taking aspirin and plavix for CAD. He had cardaic stents placed ten years ago. He was told that he cannot stop plavix for more than a few days.\par \par 11/21/22: S/P ThuLEP, procedure uncomplicated, passed void trial POD 1. \par \par Pathology with 175 g benign prostate tissue\par \par 11/30/22: Doing well, voiding with good stream. Ongoing hematuria that worsened when he restarted plavix, so he stopped plavix and restarted aspirin. No significant clots. Mild stress leakage, occasional urge leakage if he doesn't go to the bathroom regularly.\par \par PVR 5 cc\par \par 12/28/22: Doing well. Voiding well. Hematuria resolved. Ongoing stress incontinence that has improved significantly. Occasional urge incontinence if not near a bathroom. No fevers, chills, dysuria.\par \par IPSS 7, QOL 3, PVR 0 cc \par \par 2/7/23:\par Patient returns for followup. s/p ThuLEP on 11/21/22. He is doing well. Feeling 90% better. He is having minimal stress associated incontinence which has improved. He is wearing 1 pad when he goes out. He is voiding with strong stream. Denies dysuria or gross hematuria.\par \par IPSS: 12, QOL:1 \par UA: small WBC, mod blood \par PVR: 69 cc

## 2023-02-08 ENCOUNTER — NON-APPOINTMENT (OUTPATIENT)
Age: 63
End: 2023-02-08

## 2023-02-08 LAB
APPEARANCE: CLEAR
BACTERIA: NEGATIVE
BILIRUBIN URINE: NEGATIVE
BLOOD URINE: ABNORMAL
COLOR: NORMAL
GLUCOSE QUALITATIVE U: NEGATIVE
HYALINE CASTS: 0 /LPF
KETONES URINE: NEGATIVE
LEUKOCYTE ESTERASE URINE: ABNORMAL
MICROSCOPIC-UA: NORMAL
NITRITE URINE: NEGATIVE
PH URINE: 6
PROTEIN URINE: NEGATIVE
PSA SERPL-MCNC: 0.21 NG/ML
RED BLOOD CELLS URINE: 3 /HPF
SPECIFIC GRAVITY URINE: 1.01
SQUAMOUS EPITHELIAL CELLS: 0 /HPF
UROBILINOGEN URINE: NORMAL
WHITE BLOOD CELLS URINE: 6 /HPF

## 2023-02-09 LAB — BACTERIA UR CULT: NORMAL

## 2023-05-09 ENCOUNTER — APPOINTMENT (OUTPATIENT)
Dept: UROLOGY | Facility: CLINIC | Age: 63
End: 2023-05-09
Payer: MEDICARE

## 2023-05-09 VITALS
TEMPERATURE: 98 F | DIASTOLIC BLOOD PRESSURE: 85 MMHG | OXYGEN SATURATION: 95 % | HEART RATE: 68 BPM | SYSTOLIC BLOOD PRESSURE: 135 MMHG

## 2023-05-09 DIAGNOSIS — R31.29 OTHER MICROSCOPIC HEMATURIA: ICD-10-CM

## 2023-05-09 DIAGNOSIS — R97.20 ELEVATED PROSTATE, SPECIFIC ANTIGEN [PSA]: ICD-10-CM

## 2023-05-09 LAB
BILIRUB UR QL STRIP: NORMAL
CLARITY UR: CLEAR
COLLECTION METHOD: NORMAL
GLUCOSE UR-MCNC: NORMAL
HCG UR QL: 0.2 EU/DL
HGB UR QL STRIP.AUTO: NORMAL
KETONES UR-MCNC: NORMAL
LEUKOCYTE ESTERASE UR QL STRIP: NORMAL
NITRITE UR QL STRIP: NORMAL
PH UR STRIP: 5
PROT UR STRIP-MCNC: NORMAL
SP GR UR STRIP: >=1.03

## 2023-05-09 PROCEDURE — 99214 OFFICE O/P EST MOD 30 MIN: CPT

## 2023-05-09 PROCEDURE — 51798 US URINE CAPACITY MEASURE: CPT

## 2023-05-09 PROCEDURE — 81003 URINALYSIS AUTO W/O SCOPE: CPT | Mod: QW

## 2023-05-09 NOTE — HISTORY OF PRESENT ILLNESS
[FreeTextEntry1] : 62 yr old male, referred by Dr. Mckeon, to discuss prostate reductive surgery with HoLEP. Patient has history of enlarged prostate that he has been seeing a urologist, Dr. Harris, for 4 years and has recommended he have a procedure due to size. He has been to ED 3x in the last month for urinary retention He currently has a catheter that was placed on 11/9/22. His prostate measured 200 cc on ultrasound on 11/10/22 with Dr. Mckeon.\par \par His baseline urination is slow stream, frequency, urgency, hesitancy, nocturia, post-void dribbling. He has stress and urge incontinence which requires him to wear depends. \par \par He underwent prostate biopsy in 2020 which was reportedly benign. \par \par He is taking aspirin and plavix for CAD. He had cardaic stents placed ten years ago. He was told that he cannot stop plavix for more than a few days.\par \par 11/21/22: S/P ThuLEP, procedure uncomplicated, passed void trial POD 1. \par \par Pathology with 175 g benign prostate tissue\par \par 11/30/22: Doing well, voiding with good stream. Ongoing hematuria that worsened when he restarted plavix, so he stopped plavix and restarted aspirin. No significant clots. Mild stress leakage, occasional urge leakage if he doesn't go to the bathroom regularly.\par \par PVR 5 cc\par \par 12/28/22: Doing well. Voiding well. Hematuria resolved. Ongoing stress incontinence that has improved significantly. Occasional urge incontinence if not near a bathroom. No fevers, chills, dysuria.\par \par IPSS 7, QOL 3, PVR 0 cc \par \par 2/7/23:\par Patient returns for followup. s/p ThuLEP on 11/21/22. He is doing well. Feeling 90% better. He is having minimal stress associated incontinence which has improved. He is wearing 1 pad when he goes out. He is voiding with strong stream. Denies dysuria or gross hematuria.\par \par IPSS: 12, QOL:1 \par UA: small WBC, mod blood \par PVR: 69 cc \par \par 5/9/23:\par Patient returns for follow-up. Patient doing very well, is happy with his urination pattern. He is having some mild urgency, but is not bothered. He denies any incontinence. Ongoing ED, not much improved with cialis 5-10 mg prn.\par \par PSA was 0.21 on 2/8/23\par \par IPSS: 7, QOL: 1 \par PVR: 1 ml\par UA: trace blood

## 2023-05-09 NOTE — ASSESSMENT
[FreeTextEntry1] : Assessment: Mr. NEETU BERUMEN is a 62 year year old man with urinary retention, prostate volume of 200 cc. S/P ThuLEP on 11/21/22, procedure uncomplicated, passed void trial POD 1. Pathology with 175 g benign prostate tissue. Doing very well, voiding with good stream. Still with some mild urgency, but not too bothersome. His stress incontinence has resolved. Ongoing ED, not much response to cialis 5-10 mg prn. Will start daily tadalafil with prn tadalafil as wel.\par  - Begin tadalafil 5 mg QD\par  - Take tadalafil 5-10 mg prn\par - F/u UA\par - RTC in 1 year

## 2023-05-10 LAB
APPEARANCE: CLEAR
BACTERIA: NEGATIVE /HPF
BILIRUBIN URINE: NEGATIVE
BLOOD URINE: NEGATIVE
CAST: 0 /LPF
COLOR: YELLOW
EPITHELIAL CELLS: 1 /HPF
GLUCOSE QUALITATIVE U: NEGATIVE MG/DL
KETONES URINE: NEGATIVE MG/DL
LEUKOCYTE ESTERASE URINE: NEGATIVE
MICROSCOPIC-UA: NORMAL
NITRITE URINE: NEGATIVE
PH URINE: 5.5
PROTEIN URINE: NEGATIVE MG/DL
RED BLOOD CELLS URINE: 0 /HPF
SPECIFIC GRAVITY URINE: 1.03
UROBILINOGEN URINE: 0.2 MG/DL
WHITE BLOOD CELLS URINE: 0 /HPF

## 2023-08-16 RX ORDER — TADALAFIL 5 MG/1
5 TABLET ORAL
Qty: 90 | Refills: 3 | Status: ACTIVE | COMMUNITY
Start: 2023-05-09 | End: 1900-01-01

## 2024-05-06 PROBLEM — N52.9 ERECTILE DYSFUNCTION: Status: ACTIVE | Noted: 2023-02-07

## 2024-05-06 PROBLEM — N40.1 BENIGN PROSTATIC HYPERPLASIA WITH LOWER URINARY TRACT SYMPTOMS, SYMPTOM DETAILS UNSPECIFIED: Status: ACTIVE | Noted: 2022-11-11

## 2024-05-07 ENCOUNTER — APPOINTMENT (OUTPATIENT)
Dept: UROLOGY | Facility: CLINIC | Age: 64
End: 2024-05-07
Payer: MEDICARE

## 2024-05-07 VITALS
DIASTOLIC BLOOD PRESSURE: 79 MMHG | SYSTOLIC BLOOD PRESSURE: 128 MMHG | HEIGHT: 72 IN | HEART RATE: 77 BPM | WEIGHT: 210 LBS | BODY MASS INDEX: 28.44 KG/M2 | TEMPERATURE: 97.5 F

## 2024-05-07 DIAGNOSIS — N40.1 BENIGN PROSTATIC HYPERPLASIA WITH LOWER URINARY TRACT SYMPMS: ICD-10-CM

## 2024-05-07 DIAGNOSIS — N52.9 MALE ERECTILE DYSFUNCTION, UNSPECIFIED: ICD-10-CM

## 2024-05-07 PROCEDURE — 99214 OFFICE O/P EST MOD 30 MIN: CPT

## 2024-05-07 PROCEDURE — G2211 COMPLEX E/M VISIT ADD ON: CPT

## 2024-05-07 PROCEDURE — 51798 US URINE CAPACITY MEASURE: CPT

## 2024-05-07 RX ORDER — TADALAFIL 5 MG/1
5 TABLET ORAL
Qty: 90 | Refills: 3 | Status: ACTIVE | COMMUNITY
Start: 2024-05-07 | End: 1900-01-01

## 2024-05-07 NOTE — HISTORY OF PRESENT ILLNESS
[FreeTextEntry1] : 62 yr old male, referred by Dr. Mckeon, to discuss prostate reductive surgery with HoLEP. Patient has history of enlarged prostate that he has been seeing a urologist, Dr. Harris, for 4 years and has recommended he have a procedure due to size. He has been to ED 3x in the last month for urinary retention He currently has a catheter that was placed on 11/9/22. His prostate measured 200 cc on ultrasound on 11/10/22 with Dr. Mckeon.  His baseline urination is slow stream, frequency, urgency, hesitancy, nocturia, post-void dribbling. He has stress and urge incontinence which requires him to wear depends.   He underwent prostate biopsy in 2020 which was reportedly benign.   He is taking aspirin and plavix for CAD. He had cardaic stents placed ten years ago. He was told that he cannot stop plavix for more than a few days.  11/21/22: S/P ThuLEP, procedure uncomplicated, passed void trial POD 1.   Pathology with 175 g benign prostate tissue  11/30/22: Doing well, voiding with good stream. Ongoing hematuria that worsened when he restarted plavix, so he stopped plavix and restarted aspirin. No significant clots. Mild stress leakage, occasional urge leakage if he doesn't go to the bathroom regularly.  PVR 5 cc  12/28/22: Doing well. Voiding well. Hematuria resolved. Ongoing stress incontinence that has improved significantly. Occasional urge incontinence if not near a bathroom. No fevers, chills, dysuria.  IPSS 7, QOL 3, PVR 0 cc   2/7/23: Patient returns for followup. s/p ThuLEP on 11/21/22. He is doing well. Feeling 90% better. He is having minimal stress associated incontinence which has improved. He is wearing 1 pad when he goes out. He is voiding with strong stream. Denies dysuria or gross hematuria.  IPSS: 12, QOL:1  UA: small WBC, mod blood  PVR: 69 cc   5/9/23: Patient returns for follow-up. Patient doing very well, is happy with his urination pattern. He is having some mild urgency, but is not bothered. He denies any incontinence. Ongoing ED, not much improved with cialis 5-10 mg prn.  PSA was 0.21 on 2/8/23  IPSS: 7, QOL: 1  PVR: 1 ml UA: trace blood   5/7/24: Doing well, voiding with strong stream, complete emptying, no leakage, no hematuria. Was unable to get tadalafil daily for ED. Would like to try still.    cc several hours after last voiding, no urge to urinate

## 2024-05-07 NOTE — ASSESSMENT
[FreeTextEntry1] : Assessment: Mr. NEETU BERUMEN is a 62 year year old man with urinary retention, prostate volume of 200 cc. S/P ThuLEP on 11/21/22, procedure uncomplicated, passed void trial POD 1. Pathology with 175 g benign prostate tissue. Doing very well, voiding with good stream, no incontinence. Ongoing ED, will try daily tadalafil.   - F/U PSA  - Tadalafil 5 mg QD - RTC in 1 year

## 2025-05-07 ENCOUNTER — NON-APPOINTMENT (OUTPATIENT)
Age: 65
End: 2025-05-07

## 2025-05-07 ENCOUNTER — APPOINTMENT (OUTPATIENT)
Dept: UROLOGY | Facility: CLINIC | Age: 65
End: 2025-05-07
Payer: MEDICARE

## 2025-05-07 VITALS
DIASTOLIC BLOOD PRESSURE: 58 MMHG | SYSTOLIC BLOOD PRESSURE: 108 MMHG | TEMPERATURE: 97.3 F | BODY MASS INDEX: 28.44 KG/M2 | WEIGHT: 210 LBS | HEIGHT: 72 IN | HEART RATE: 72 BPM

## 2025-05-07 DIAGNOSIS — N52.9 MALE ERECTILE DYSFUNCTION, UNSPECIFIED: ICD-10-CM

## 2025-05-07 DIAGNOSIS — N40.1 BENIGN PROSTATIC HYPERPLASIA WITH LOWER URINARY TRACT SYMPMS: ICD-10-CM

## 2025-05-07 PROCEDURE — 51798 US URINE CAPACITY MEASURE: CPT

## 2025-05-07 PROCEDURE — 99214 OFFICE O/P EST MOD 30 MIN: CPT

## 2025-05-07 PROCEDURE — G2211 COMPLEX E/M VISIT ADD ON: CPT

## 2025-05-08 ENCOUNTER — NON-APPOINTMENT (OUTPATIENT)
Age: 65
End: 2025-05-08

## 2025-05-08 LAB
APPEARANCE: CLEAR
BACTERIA: NEGATIVE /HPF
BILIRUBIN URINE: NEGATIVE
BLOOD URINE: NEGATIVE
CAST: 0 /LPF
COLOR: YELLOW
EPITHELIAL CELLS: 0 /HPF
GLUCOSE QUALITATIVE U: NEGATIVE MG/DL
KETONES URINE: NEGATIVE MG/DL
LEUKOCYTE ESTERASE URINE: NEGATIVE
MICROSCOPIC-UA: NORMAL
NITRITE URINE: NEGATIVE
PH URINE: 5.5
PROTEIN URINE: NEGATIVE MG/DL
PSA SERPL-MCNC: 0.35 NG/ML
RED BLOOD CELLS URINE: 0 /HPF
SPECIFIC GRAVITY URINE: 1.01
TESTOST SERPL-MCNC: 362 NG/DL
UROBILINOGEN URINE: 0.2 MG/DL
WHITE BLOOD CELLS URINE: 0 /HPF

## 2025-05-09 ENCOUNTER — NON-APPOINTMENT (OUTPATIENT)
Age: 65
End: 2025-05-09

## 2025-05-12 LAB — BACTERIA UR CULT: NORMAL

## (undated) DEVICE — TUBING SET FOR SUCTION PUMP

## (undated) DEVICE — GLV 7.5 PROTEXIS (WHITE)

## (undated) DEVICE — ELCTR BIVAP BIPOLAR VAPORIZATION 26FR

## (undated) DEVICE — CONTAINER TISSUE COLLECTING DISP

## (undated) DEVICE — FOLEY CATH 3-WAY 22FR 30CC LATEX HEMATURIA COUDE

## (undated) DEVICE — MEDELA OVERFLOW FILTER DISPOSABLE

## (undated) DEVICE — TUBING TUR 2 PRONG

## (undated) DEVICE — UROVAC

## (undated) DEVICE — PACK CYSTO

## (undated) DEVICE — DRAINAGE BAG URINARY 4L

## (undated) DEVICE — ELCTR CUTTING 24/26FR

## (undated) DEVICE — TUBING RANGER FLUID IRRIGATION SET DISP

## (undated) DEVICE — FOLEY CATH 3-WAY 22FR 30CC LATEX COUDE HOTTER

## (undated) DEVICE — TAPE SILK 3"

## (undated) DEVICE — SOL IRR BAG NS 0.9% 3000ML